# Patient Record
Sex: FEMALE | Race: WHITE
[De-identification: names, ages, dates, MRNs, and addresses within clinical notes are randomized per-mention and may not be internally consistent; named-entity substitution may affect disease eponyms.]

---

## 2020-07-06 ENCOUNTER — RX ONLY (RX ONLY)
Age: 25
End: 2020-07-06

## 2020-07-06 ENCOUNTER — OPTICAL OFFICE (OUTPATIENT)
Dept: URBAN - NONMETROPOLITAN AREA CLINIC 5 | Facility: CLINIC | Age: 25
Setting detail: OPHTHALMOLOGY
End: 2020-07-06
Payer: COMMERCIAL

## 2020-07-06 ENCOUNTER — DOCTOR'S OFFICE (OUTPATIENT)
Dept: URBAN - NONMETROPOLITAN AREA CLINIC 2 | Facility: CLINIC | Age: 25
Setting detail: OPHTHALMOLOGY
End: 2020-07-06
Payer: COMMERCIAL

## 2020-07-06 DIAGNOSIS — H52.13: ICD-10-CM

## 2020-07-06 PROBLEM — H43.812: Status: ACTIVE | Noted: 2020-07-06

## 2020-07-06 PROCEDURE — 92310 CONTACT LENS FITTING OU: CPT | Performed by: OPTOMETRIST

## 2020-07-06 PROCEDURE — V2784 LENS POLYCARB OR EQUAL: HCPCS | Performed by: OPTOMETRIST

## 2020-07-06 PROCEDURE — V2102 SINGL VISN SPHERE 7.12-20.00: HCPCS | Performed by: OPTOMETRIST

## 2020-07-06 PROCEDURE — V2750 ANTI-REFLECTIVE COATING: HCPCS | Performed by: OPTOMETRIST

## 2020-07-06 PROCEDURE — 92004 COMPRE OPH EXAM NEW PT 1/>: CPT | Performed by: OPTOMETRIST

## 2020-07-06 PROCEDURE — V2020 VISION SVCS FRAMES PURCHASES: HCPCS | Performed by: OPTOMETRIST

## 2020-07-06 ASSESSMENT — AXIALLENGTH_DERIVED
OD_AL: 28.3991
OS_AL: 28.2546

## 2020-07-06 ASSESSMENT — REFRACTION_CURRENTRX
OS_OVR_VA: 20/
OD_OVR_VA: 20/
OD_VPRISM_DIRECTION: SV
OS_VPRISM_DIRECTION: SV
OD_SPHERE: -6.25
OS_SPHERE: -6.25

## 2020-07-06 ASSESSMENT — KERATOMETRY
OD_AXISANGLE_DEGREES: 013
OD_K2POWER_DIOPTERS: 44.00
OS_AXISANGLE_DEGREES: 086
OS_K1POWER_DIOPTERS: 43.00
OD_K1POWER_DIOPTERS: 43.00
OS_K2POWER_DIOPTERS: 43.75

## 2020-07-06 ASSESSMENT — REFRACTION_MANIFEST
OS_VA2: 20/20
OU_VA: 20/20
OD_VA1: 20/20
OD_SPHERE: -9.00
OD_VA2: 20/20
OS_VA1: 20/20
OS_SPHERE: -8.50

## 2020-07-06 ASSESSMENT — REFRACTION_AUTOREFRACTION
OS_AXIS: 135
OD_SPHERE: -10.00
OS_CYLINDER: -0.75
OS_SPHERE: -9.50
OD_AXIS: 167
OD_CYLINDER: -0.50

## 2020-07-06 ASSESSMENT — VISUAL ACUITY
OS_BCVA: 20/25
OD_BCVA: 20/25-2

## 2020-07-06 ASSESSMENT — SPHEQUIV_DERIVED
OS_SPHEQUIV: -9.875
OD_SPHEQUIV: -10.25

## 2020-07-06 ASSESSMENT — CONFRONTATIONAL VISUAL FIELD TEST (CVF)
OD_FINDINGS: FULL
OS_FINDINGS: FULL

## 2020-07-08 ENCOUNTER — OPTICAL OFFICE (OUTPATIENT)
Dept: URBAN - NONMETROPOLITAN AREA CLINIC 5 | Facility: CLINIC | Age: 25
Setting detail: OPHTHALMOLOGY
End: 2020-07-08

## 2020-07-08 DIAGNOSIS — H52.13: ICD-10-CM

## 2020-07-08 PROCEDURE — S0500 DISPOS CONT LENS: HCPCS | Performed by: OPTOMETRIST

## 2023-09-11 ENCOUNTER — APPOINTMENT (OUTPATIENT)
Dept: URGENT CARE | Facility: CLINIC | Age: 28
End: 2023-09-11
Payer: COMMERCIAL

## 2023-09-11 ENCOUNTER — OFFICE VISIT (OUTPATIENT)
Dept: URGENT CARE | Facility: CLINIC | Age: 28
End: 2023-09-11
Payer: COMMERCIAL

## 2023-09-11 VITALS
TEMPERATURE: 98.2 F | RESPIRATION RATE: 18 BRPM | DIASTOLIC BLOOD PRESSURE: 75 MMHG | HEART RATE: 108 BPM | SYSTOLIC BLOOD PRESSURE: 145 MMHG | OXYGEN SATURATION: 100 %

## 2023-09-11 DIAGNOSIS — R30.0 DYSURIA: ICD-10-CM

## 2023-09-11 DIAGNOSIS — N89.8 VAGINAL DISCHARGE: Primary | ICD-10-CM

## 2023-09-11 LAB
SL AMB  POCT GLUCOSE, UA: ABNORMAL
SL AMB LEUKOCYTE ESTERASE,UA: ABNORMAL
SL AMB POCT BILIRUBIN,UA: ABNORMAL
SL AMB POCT BLOOD,UA: ABNORMAL
SL AMB POCT CLARITY,UA: CLEAR
SL AMB POCT COLOR,UA: YELLOW
SL AMB POCT KETONES,UA: ABNORMAL
SL AMB POCT NITRITE,UA: ABNORMAL
SL AMB POCT PH,UA: 7.5
SL AMB POCT SPECIFIC GRAVITY,UA: 1015
SL AMB POCT URINE PROTEIN: ABNORMAL
SL AMB POCT UROBILINOGEN: 0.2

## 2023-09-11 PROCEDURE — 81002 URINALYSIS NONAUTO W/O SCOPE: CPT

## 2023-09-11 PROCEDURE — 99203 OFFICE O/P NEW LOW 30 MIN: CPT

## 2023-09-11 PROCEDURE — 87591 N.GONORRHOEAE DNA AMP PROB: CPT

## 2023-09-11 PROCEDURE — 87661 TRICHOMONAS VAGINALIS AMPLIF: CPT

## 2023-09-11 PROCEDURE — 87086 URINE CULTURE/COLONY COUNT: CPT

## 2023-09-11 PROCEDURE — 87563 M. GENITALIUM AMP PROBE: CPT

## 2023-09-11 PROCEDURE — 87491 CHLMYD TRACH DNA AMP PROBE: CPT

## 2023-09-11 RX ORDER — LEVOTHYROXINE SODIUM 0.03 MG/1
25 TABLET ORAL DAILY
COMMUNITY
Start: 2023-07-17

## 2023-09-11 RX ORDER — FLUCONAZOLE 150 MG/1
150 TABLET ORAL ONCE
Qty: 1 TABLET | Refills: 0 | Status: SHIPPED | OUTPATIENT
Start: 2023-09-11 | End: 2023-09-11 | Stop reason: ALTCHOICE

## 2023-09-11 RX ORDER — BROMPHENIRAMIN/PSEUDOEPHEDRINE 1-15MG/5ML
LIQUID (ML) ORAL
Qty: 63 G | Refills: 0 | Status: SHIPPED | OUTPATIENT
Start: 2023-09-11 | End: 2023-09-14

## 2023-09-11 RX ORDER — OMEPRAZOLE 40 MG/1
40 CAPSULE, DELAYED RELEASE ORAL DAILY
COMMUNITY

## 2023-09-11 RX ORDER — BUSPIRONE HYDROCHLORIDE 10 MG/1
10 TABLET ORAL 3 TIMES DAILY
COMMUNITY

## 2023-09-11 NOTE — LETTER
September 11, 2023     Patient: Jason Talbot   YOB: 1995   Date of Visit: 9/11/2023       To Whom It May Concern: It is my medical opinion that Jason Talbot may return to work on 9/12/2023 . If you have any questions or concerns, please don't hesitate to call.          Sincerely,        [employer], [employer]    CC: No Recipients

## 2023-09-11 NOTE — PATIENT INSTRUCTIONS
1. Start antibiotic as directed. 2.  Push fluids. 3.  If burning on urination, you may try over the counter Azo Urinary Pain Relief or comparable product. Please note that this type of product may cause your urine to become bright orange and it may stain your undergarments if you leak. Please wear protection as needed. 4.  Your urine will be sent for culture and sensitivity to try and make sure that the medicine prescribed is appropriate for your infection. Results take approximately 72 hours to return. If you have not heard from your [facility] provider by 4 days after your office visit, please call phone number at the top of your clinical summary for your results. 5.  If develop worsening symptoms with associated fever, back pain, nausea with vomiting, please proceed to emergency room for further evaluation. 6.  If you have recurrent urinary tract problems, please follow up with your PCP and / or urologist for further evaluation.

## 2023-09-11 NOTE — PROGRESS NOTES
[facility]        NAME: Sudha Owusu is a 32 y.o. female  : 1995    MRN: [id number]  DATE: 2023  TIME: 2:54 PM    Assessment and Plan   Vaginal discharge [N89.8]  1. Vaginal discharge  Chlamydia/GC amplified DNA by PCR    Clotrimazole (Clotrimazole 3) 2 % vaginal cream    TRICHOMONAS VAGINALIS (TV), TMA    Trichomonas vaginalis/Mycoplasma genitalium PCR    Trichomonas vaginalis/Mycoplasma genitalium PCR    DISCONTINUED: fluconazole (DIFLUCAN) 150 mg tablet    CANCELED: TRICHOMONAS VAGINALIS (TV), TMA      2. Dysuria  POCT urine dip    Urine culture    Chlamydia/GC amplified DNA by PCR    TRICHOMONAS VAGINALIS (TV), TMA    DISCONTINUED: fluconazole (DIFLUCAN) 150 mg tablet    CANCELED: TRICHOMONAS VAGINALIS (TV), TMA        Urine dip positive for blood- will send culture  Pt requesting std TESTING, same sent. Will treat as yeast infection. Due to interaction with buspar will change to vaginal suppository, pt in agreement. Patient Instructions   1. Start antibiotic as directed. 2.  Push fluids. 3.  If burning on urination, you may try over the counter Azo Urinary Pain Relief or comparable product. Please note that this type of product may cause your urine to become bright orange and it may stain your undergarments if you leak. Please wear protection as needed. 4.  Your urine will be sent for culture and sensitivity to try and make sure that the medicine prescribed is appropriate for your infection. Results take approximately 72 hours to return. If you have not heard from your [facility] provider by 4 days after your office visit, please call phone number at the top of your clinical summary for your results. 5.  If develop worsening symptoms with associated fever, back pain, nausea with vomiting, please proceed to emergency room for further evaluation.     6.  If you have recurrent urinary tract problems, please follow up with your PCP and / or urologist for further evaluation. Follow up with PCP in 3-5 days. Proceed to  ER if symptoms worsen. Chief Complaint     Chief Complaint   Patient presents with   • Vaginal Discharge         History of Present Illness       Pt is a 32year old female who presents to the office today for white thick vaginal discharge that started on Friday with bilateral lower abdominal pain. Denies vaginal odor. Denies fever. Also noted dysuria and hematuria. Is currently sexually active with 1 single male partner, unsure if STD exposure. LMP 1-2 weeks ago, denies concern for pregnancy. Has ob-gyn. Review of Systems   Review of Systems   Constitutional: Negative for fever. Genitourinary: Positive for dyspareunia, dysuria, hematuria and vaginal discharge. Negative for genital sores and pelvic pain. All other systems reviewed and are negative. Current Medications       Current Outpatient Medications:   •  busPIRone (BUSPAR) 10 mg tablet, Take 10 mg by mouth 3 (three) times a day Take 1-3 times a day, Disp: , Rfl:   •  Clotrimazole (Clotrimazole 3) 2 % vaginal cream, Insert into the vagina daily at bedtime for 3 days, Disp: 63 g, Rfl: 0  •  omeprazole (PriLOSEC) 40 MG capsule, Take 40 mg by mouth daily, Disp: , Rfl:   •  levothyroxine 25 mcg tablet, Take 25 mcg by mouth daily, Disp: , Rfl:     Current Allergies     Allergies as of 09/11/2023   • (No Known Allergies)            The following portions of the patient's history were reviewed and updated as appropriate: allergies, current medications, past family history, past medical history, past social history, past surgical history and problem list.     History reviewed. No pertinent past medical history. History reviewed. No pertinent surgical history. History reviewed. No pertinent family history. Medications have been verified.         Objective   /75   Pulse (!) 108   Temp 98.2 °F (36.8 °C)   Resp 18   SpO2 100%        Physical Exam     Physical Exam  Vitals and nursing note reviewed. Constitutional:       Appearance: Normal appearance. She is normal weight. Cardiovascular:      Rate and Rhythm: Normal rate and regular rhythm. Pulses: Normal pulses. Heart sounds: Normal heart sounds. Pulmonary:      Effort: Pulmonary effort is normal.      Breath sounds: Normal breath sounds. Abdominal:      Tenderness: There is no abdominal tenderness. There is no right CVA tenderness or left CVA tenderness. Skin:     General: Skin is warm. Capillary Refill: Capillary refill takes less than 2 seconds. Neurological:      General: No focal deficit present. Mental Status: She is alert and oriented to person, place, and time.

## 2023-09-12 LAB
BACTERIA UR CULT: NORMAL
C TRACH DNA SPEC QL NAA+PROBE: NEGATIVE
M GENITALIUM DNA SPEC QL NAA+PROBE: NEGATIVE
N GONORRHOEA DNA SPEC QL NAA+PROBE: NEGATIVE
T VAGINALIS DNA SPEC QL NAA+PROBE: NEGATIVE

## 2023-09-18 ENCOUNTER — TELEPHONE (OUTPATIENT)
Dept: URGENT CARE | Facility: CLINIC | Age: 28
End: 2023-09-18

## 2023-09-18 NOTE — TELEPHONE ENCOUNTER
Patient called to review test results from last Monday. Reviewed with patient that all test results came back negative. Patient states that she is feeling better and offers no further questions or concerns.